# Patient Record
Sex: MALE | Race: WHITE | Employment: OTHER
[De-identification: names, ages, dates, MRNs, and addresses within clinical notes are randomized per-mention and may not be internally consistent; named-entity substitution may affect disease eponyms.]

---

## 2022-03-01 ENCOUNTER — TRANSCRIBE ORDERS (OUTPATIENT)
Dept: OTHER | Age: 72
End: 2022-03-01
Payer: MEDICARE

## 2022-03-01 DIAGNOSIS — H49.12 4TH NERVE PALSY, LEFT: ICD-10-CM

## 2022-03-01 DIAGNOSIS — H53.2 VERTICAL DIPLOPIA: Primary | ICD-10-CM

## 2022-03-04 NOTE — TELEPHONE ENCOUNTER
FUTURE VISIT INFORMATION      FUTURE VISIT INFORMATION:    Date: 4/6/22    Time: 7:15am    Location: [facility] [place]  REFERRAL INFORMATION:    Referring provider:  Mariah Nice DO    Referring providers clinic:  [facility]    Reason for visit/diagnosis  Vertical diplopia & 4th nerve palsy, left     RECORDS REQUESTED FROM:       Clinic name Comments Records Status Imaging Status   [facility] Consultation/referral 2/25/22  MR Head done 11/30/21, MR head done 11/27/21  - requested image be pushed Care Everywhere  PACs     * 3/15/22 7:43 AM Images received from  and attached to the patient in PACs. - Angélica

## 2022-04-06 ENCOUNTER — PRE VISIT (OUTPATIENT)
Dept: OPHTHALMOLOGY | Facility: CLINIC | Age: 72
End: 2022-04-06

## 2022-04-06 ENCOUNTER — OFFICE VISIT (OUTPATIENT)
Dept: OPHTHALMOLOGY | Facility: CLINIC | Age: 72
End: 2022-04-06
Attending: PSYCHIATRY & NEUROLOGY
Payer: MEDICARE

## 2022-04-06 DIAGNOSIS — H53.2 VERTICAL DIPLOPIA: ICD-10-CM

## 2022-04-06 DIAGNOSIS — H49.12 4TH NERVE PALSY, LEFT: ICD-10-CM

## 2022-04-06 DIAGNOSIS — H53.10 SUBJECTIVE VISUAL DISTURBANCE: Primary | ICD-10-CM

## 2022-04-06 PROCEDURE — 99204 OFFICE O/P NEW MOD 45 MIN: CPT | Performed by: INTERNAL MEDICINE

## 2022-04-06 PROCEDURE — 92060 SENSORIMOTOR EXAMINATION: CPT | Performed by: INTERNAL MEDICINE

## 2022-04-06 RX ORDER — METOPROLOL SUCCINATE 100 MG/1
0.5 TABLET, EXTENDED RELEASE ORAL DAILY
COMMUNITY
Start: 2021-09-29

## 2022-04-06 RX ORDER — AMOXICILLIN 875 MG
TABLET ORAL
COMMUNITY
Start: 2021-09-02

## 2022-04-06 RX ORDER — FAMOTIDINE 20 MG/1
TABLET, FILM COATED ORAL
COMMUNITY
Start: 2021-08-28

## 2022-04-06 RX ORDER — CLOPIDOGREL BISULFATE 75 MG/1
TABLET ORAL
COMMUNITY
Start: 2021-07-01

## 2022-04-06 RX ORDER — NITROGLYCERIN 0.4 MG/1
TABLET SUBLINGUAL
COMMUNITY
Start: 2021-07-30

## 2022-04-06 ASSESSMENT — REFRACTION_WEARINGRX
OS_AXIS: 173
OS_VBASE: DOWN
OS_HPRISM: 1.0
OD_VPRISM: 2.0
OD_SPHERE: -0.50
OD_CYLINDER: SPHERE
OS_ADD: +2.50
OS_HBASE: OUT
OD_HBASE: OUT
OD_HPRISM: 1.0
OD_VBASE: UP
OS_VPRISM: 1.0
OS_SPHERE: -2.25
OS_CYLINDER: +1.75
OD_ADD: +2.50
SPECS_TYPE: PAL

## 2022-04-06 ASSESSMENT — CUP TO DISC RATIO
OS_RATIO: 0.25
OD_RATIO: 0.25

## 2022-04-06 ASSESSMENT — CONF VISUAL FIELD
OD_NORMAL: 1
METHOD: COUNTING FINGERS
OS_NORMAL: 1

## 2022-04-06 ASSESSMENT — VISUAL ACUITY
OS_PH_SC+: +1
OS_PH_SC: 20/25
METHOD: SNELLEN - LINEAR
OS_CC+: -2
OS_CC: 20/20
OS_SC: 20/60
CORRECTION_TYPE: GLASSES
OD_CC: 20/20

## 2022-04-06 ASSESSMENT — TONOMETRY
IOP_METHOD: TONOPEN
OS_IOP_MMHG: 15
OD_IOP_MMHG: 15

## 2022-04-06 ASSESSMENT — EXTERNAL EXAM - LEFT EYE: OS_EXAM: NORMAL

## 2022-04-06 ASSESSMENT — SLIT LAMP EXAM - LIDS
COMMENTS: NORMAL
COMMENTS: NORMAL

## 2022-04-06 ASSESSMENT — EXTERNAL EXAM - RIGHT EYE: OD_EXAM: NORMAL

## 2022-04-06 NOTE — LETTER
"2022         RE:  :  MRN: Alphonso Bauer  1950  [id number]     Dear Dr. Nice,    Thank you for asking me to see your very pleasant patient, Alphonso Bauer, in neuro-ophthalmic consultation.  I would like to thank you for sending your records and I have summarized them in the history of present illness.  My assessment and plan are below.  For further details, please see my attached clinic note.      Assessment & Plan     Alphonso Bauer is a 71 year old male with the following diagnoses:   1. Vertical diplopia    2. 4th nerve palsy, left       Patient was sent for consultation by Dr. Mariah Nice for diplopia.     HPI:  Patient was evaluated in the ED on 21 for complaints of acute onset of binocular vertical diplopia. He underwent several MRIs of the brain including a 1 mm 3D STEALTH and 2 mm DWI sequences that did not reveal any enhancing lesions or restricted diffusion in the midbrain or adjacent to the brainstem.    [facility] who ordered a new pair of glasses with less prism.     He reports that he has been seeing a shadow in the left eye with the right eye covered and this is his primary complaint today. He has experienced significant improvement in his diplopia since its onset and possible near resolution. He reports that in the hospital, when he looked at the ceiling, he clearly saw the tiling in the ceiling become double when looking to the right but denies this when looking at the ceiling in the exam room today. He reports that when he initially developed diplopia, when looking out his window he could see two stop signs clearly stacked on top of each other but around one month ago regained the ability to see only one stop sign.    He reports chronic headaches and follows with neurology for for bilateral occipital neuralgia.     Denies history of strabismus, \"lazy eye.\" No family history of strabismus. No history of head trauma.    Independent " historians:  Patient    Review of outside testing:  MRI head 11/30/21  IMPRESSION:     IMPRESSION:   Negative MRI brain.    MRI 11/28/21  IMPRESSION:   1. Mild chronic ethmoid sinusitis.   2. No acute intracranial abnormality identified.  No evidence for mass,   hemorrhage, or acute nonhemorrhagic infarct.     My interpretation performed today of outside testing:  Agree with above. No lesion involving  midbrain or adjacent to the brainstem.    Review of outside clinical notes:  Dr. Mariah Nice clinic notes  ED discharge notes      Past medical history:  BPH, CAD, fibromyalgia, GERD, HLD, HTN, AMY, DMII, and bilateral occipital neuralgia    Medications:   acetaminophen  aspirin  CARVEDILOL PO  diazepam  fish oil-omega-3 fatty acids  HYDROmorphone  ROBAXIN PO  rosuvastatin  venlafaxine    Family history / social history:  Reviewed  EtOH - very little  No family history of strabismus     Exam:  Visual acuity 20/20 right eye 20/20 left eye.  Color vision 11/11 right eye and 11/11 left eye.  Pupils ERRL.  Intraocular pressure 15 right eye and 15 left eye.  Anterior segment exam normal.  Fundus exam normal.  Strabismus exam  LHT 3 with SMR ortho with alt cover.    Tests ordered and interpreted today:  Sensorimotor as above    Discussion of management / interpretation with another provider:   None    Assessment/Plan:   It is my impression that patient has experienced complete resolution of microvascular left CN 4 palsy. He has residual monocular diplopia of the left eye that resolves with pinhole suggesting a refractive cause. [facility]  If these glasses resolve the monocular double vision in the left eye, then most likely the cause of his double vision is refractive.  If they do not, then I would recommend a hard contact lens refraction.  If that resolves his double vision, then most likely his problem is irregular astigmatism from a corneal source.  If the hard contact lens does  not improve his double vision, then this is most likely lenticular and he could consider cataract surgery.        Again, thank you for allowing me to participate in the care of your patient.      Sincerely,    Guille Barraza MD  Professor  Ophthalmology Residency   Director of Neuro-Ophthalmology  [employer] [facility] [employer] Endowed Chair  Departments of Ophthalmology, Neurology, and Neurosurgery  [facility] [address] [place]  [address]  T - [phone]  F - [phone]  FANNIE [email or web address]      CC: Jessie Elizabeth MD  [facility]  [address]  [place] [address]  Via Fax: [phone]     Mariah Nice DO  [facility]  [address]  [place] [address]  Via Fax: [phone]    DX = microvascular 4th nerve palsy, monocular double vision

## 2022-04-06 NOTE — PROGRESS NOTES
"     Assessment & Plan     Alphonso Bauer is a 71 year old male with the following diagnoses:   1. Vertical diplopia    2. 4th nerve palsy, left       Patient was sent for consultation by Dr. Mariah Nice for diplopia.     HPI:  Patient was evaluated in the ED on 11/27/21 for complaints of acute onset of binocular vertical diplopia. He underwent several MRIs of the brain including a 1 mm 3D STEALTH and 2 mm DWI sequences that did not reveal any enhancing lesions or restricted diffusion in the midbrain or adjacent to the brainstem.    [facility] who ordered a new pair of glasses with less prism.     He reports that he has been seeing a shadow in the left eye with the right eye covered and this is his primary complaint today. He has experienced significant improvement in his diplopia since its onset and possible near resolution. He reports that in the hospital, when he looked at the ceiling, he clearly saw the tiling in the ceiling become double when looking to the right but denies this when looking at the ceiling in the exam room today. He reports that when he initially developed diplopia, when looking out his window he could see two stop signs clearly stacked on top of each other but around one month ago regained the ability to see only one stop sign.    He reports chronic headaches and follows with neurology for for bilateral occipital neuralgia.     Denies history of strabismus, \"lazy eye.\" No family history of strabismus. No history of head trauma.    Independent historians:  Patient    Review of outside testing:  MRI head 11/30/21  IMPRESSION:     IMPRESSION:   Negative MRI brain.    MRI 11/28/21  IMPRESSION:   1. Mild chronic ethmoid sinusitis.   2. No acute intracranial abnormality identified.  No evidence for mass,   hemorrhage, or acute nonhemorrhagic infarct.     My interpretation performed today of outside testing:  Agree with above. No lesion involving  midbrain or adjacent to " the brainstem.    Review of outside clinical notes:  Dr. Mariah Nice clinic notes  ED discharge notes      Past medical history:  BPH, CAD, fibromyalgia, GERD, HLD, HTN, AMY, DMII, and bilateral occipital neuralgia    Medications:   acetaminophen  aspirin  CARVEDILOL PO  diazepam  fish oil-omega-3 fatty acids  HYDROmorphone  ROBAXIN PO  rosuvastatin  venlafaxine    Family history / social history:  Reviewed  EtOH - very little  No family history of strabismus     Exam:  Visual acuity 20/20 right eye 20/20 left eye.  Color vision 11/11 right eye and 11/11 left eye.  Pupils ERRL.  Intraocular pressure 15 right eye and 15 left eye.  Anterior segment exam normal.  Fundus exam normal.  Strabismus exam  LHT 3 with SMR ortho with alt cover.    Tests ordered and interpreted today:  Sensorimotor as above    Discussion of management / interpretation with another provider:   None    Assessment/Plan:   It is my impression that patient has experienced complete resolution of microvascular left CN 4 palsy. He has residual monocular diplopia of the left eye that resolves with pinhole suggesting a refractive cause. [facility]  If these glasses resolve the monocular double vision in the left eye, then most likely the cause of his double vision is refractive.  If they do not, then I would recommend a hard contact lens refraction.  If that resolves his double vision, then most likely his problem is irregular astigmatism from a corneal source.  If the hard contact lens does not improve his double vision, then this is most likely lenticular and he could consider cataract surgery.          Attending Physician Attestation:  Complete documentation of historical and exam elements from today's encounter can be found in the full encounter summary report (not reduplicated in this progress note).  I personally obtained the chief complaint(s) and history of present illness.  I confirmed and edited as  necessary the review of systems, past medical/surgical history, family history, social history, and examination findings as documented by others; and I examined the patient myself.  I personally reviewed the relevant tests, images, and reports as documented above.  I formulated and edited as necessary the assessment and plan and discussed the findings and management plan with the patient and family. I personally reviewed the ophthalmic test(s) associated with this encounter, agree with the interpretation(s) as documented by the resident/fellow, and have edited the corresponding report(s) as necessary.  - Guille Kimble, DO   PGY-5 Neuro-Ophthalmology Fellow

## 2022-04-06 NOTE — NURSING NOTE
Chief Complaints and History of Present Illnesses   Patient presents with     Consult For     Chief Complaint(s) and History of Present Illness(es)     Consult For     Onset: 3 months ago    Associated symptoms: double vision and headache.  Negative for eye pain and photophobia    Treatments tried: eye drops    Pain scale: 0/10              Comments     Patient was referred for strabimus by Dr Nice.  Three months ago he woke with vertical double vision.  He was hospitalized at the time and diagnosed with a 4th nerve palsy.  He had glasses with prism made at the time.  Now he is having less diplopia but does have a shadow below and to the left of the letters.  [facility] and is having new glasses made with less prism.      Yvette Reyna, COT 7:21 AM  April 6, 2022